# Patient Record
Sex: MALE | Race: WHITE | NOT HISPANIC OR LATINO | Employment: FULL TIME | URBAN - METROPOLITAN AREA
[De-identification: names, ages, dates, MRNs, and addresses within clinical notes are randomized per-mention and may not be internally consistent; named-entity substitution may affect disease eponyms.]

---

## 2020-10-16 ENCOUNTER — OFFICE VISIT (OUTPATIENT)
Dept: URGENT CARE | Facility: PHYSICIAN GROUP | Age: 55
End: 2020-10-16
Payer: COMMERCIAL

## 2020-10-16 VITALS
WEIGHT: 215 LBS | OXYGEN SATURATION: 98 % | BODY MASS INDEX: 31.84 KG/M2 | DIASTOLIC BLOOD PRESSURE: 88 MMHG | RESPIRATION RATE: 18 BRPM | TEMPERATURE: 97.1 F | SYSTOLIC BLOOD PRESSURE: 132 MMHG | HEART RATE: 75 BPM | HEIGHT: 69 IN

## 2020-10-16 DIAGNOSIS — K64.9 HEMORRHOIDS, UNSPECIFIED HEMORRHOID TYPE: ICD-10-CM

## 2020-10-16 DIAGNOSIS — J18.9 PNEUMONIA DUE TO INFECTIOUS ORGANISM, UNSPECIFIED LATERALITY, UNSPECIFIED PART OF LUNG: Primary | ICD-10-CM

## 2020-10-16 PROCEDURE — 99203 OFFICE O/P NEW LOW 30 MIN: CPT | Performed by: PHYSICIAN ASSISTANT

## 2020-10-16 RX ORDER — HYDROCORTISONE 25 MG/G
1 CREAM TOPICAL DAILY
Qty: 30 G | Refills: 0 | Status: SHIPPED | OUTPATIENT
Start: 2020-10-16 | End: 2020-10-23

## 2020-10-16 ASSESSMENT — ENCOUNTER SYMPTOMS
ABDOMINAL PAIN: 0
SPUTUM PRODUCTION: 0
ROS GI COMMENTS: HEMORRHOIDS
DIARRHEA: 0
SHORTNESS OF BREATH: 0
COUGH: 0
CONSTIPATION: 0
VOMITING: 0
SORE THROAT: 0
CHILLS: 0
FEVER: 0
NAUSEA: 0

## 2020-10-16 NOTE — PROGRESS NOTES
"Subjective:   Hayley Blas is a 54 y.o. male who presents for Pneumonia (requesting x-ray to make sure its gone, note to go back to work )        HPI     She presents to urgent care today for follow-up of pneumonia.  He was seen initially at [facility] ED 2 days ago for dizziness and chest pain.  He was evaluated and found to have pneumonia.  He was given a prescription for doxycycline twice daily for 10 days.  He has not started medication.  He denies cough, congestion, fever.  No nausea or vomiting.  No shortness of breath or chest pain.    The patient is requesting a repeat chest x-ray so he can be cleared for work.  He works as a  and visiting here from [place].    He is also requesting medication for hemorrhoids.  He has had hemorrhoids for the past few years.  He has been using over-the-counter Preparation H without relief.    Review of Systems   Constitutional: Negative for chills, fever and malaise/fatigue.   HENT: Negative for congestion and sore throat.    Respiratory: Negative for cough, sputum production and shortness of breath.         Pneumonia   Gastrointestinal: Negative for abdominal pain, constipation, diarrhea, nausea and vomiting.        Hemorrhoids   All other systems reviewed and are negative.      PMH:  has no past medical history on file.  MEDS:   Current Outpatient Medications:   •  hydrocortisone rectal (PERIANAL) 2.5% Cream, Insert 1 Application in rectum every day., Disp: 30 g, Rfl: 0  ALLERGIES: No Known Allergies  SURGHX: History reviewed. No pertinent surgical history.  SOCHX:  reports that he has never smoked. He has never used smokeless tobacco.  History reviewed. No pertinent family history.     Objective:   /88 (BP Location: Right arm, Patient Position: Sitting, BP Cuff Size: Large adult)   Pulse 75   Temp 36.2 °C (97.1 °F) (Temporal)   Resp 18   Ht 1.753 m (5' 9\")   Wt 97.5 kg (215 lb)   SpO2 98%   BMI 31.75 kg/m²     Physical Exam  Vitals signs " reviewed.   Constitutional:       General: He is not in acute distress.     Appearance: He is well-developed.   HENT:      Head: Normocephalic and atraumatic.      Right Ear: External ear normal.      Left Ear: External ear normal.      Nose: Nose normal.      Mouth/Throat:      Mouth: Mucous membranes are moist.      Pharynx: No oropharyngeal exudate or posterior oropharyngeal erythema.   Eyes:      Conjunctiva/sclera: Conjunctivae normal.      Pupils: Pupils are equal, round, and reactive to light.   Neck:      Musculoskeletal: Normal range of motion and neck supple.      Trachea: No tracheal deviation.   Cardiovascular:      Rate and Rhythm: Normal rate and regular rhythm.   Pulmonary:      Effort: Pulmonary effort is normal. No respiratory distress.      Breath sounds: Normal breath sounds. No stridor. No wheezing, rhonchi or rales.   Genitourinary:     Comments: Deferred per patient request  Skin:     General: Skin is warm and dry.      Capillary Refill: Capillary refill takes less than 2 seconds.   Neurological:      General: No focal deficit present.      Mental Status: He is alert and oriented to person, place, and time.   Psychiatric:         Mood and Affect: Mood normal.         Behavior: Behavior normal.           Assessment/Plan:     1. Pneumonia due to infectious organism, unspecified laterality, unspecified part of lung     2. Hemorrhoids, unspecified hemorrhoid type  hydrocortisone rectal (PERIANAL) 2.5% Cream     Supportive care reviewed.  I cannot clear him to return to work today but he will start doxycycline and follow-up with urgent care after completion of antibiotic.     Follow-up with primary care provider after returning home to [place].  If symptoms worsen or persist patient can return to clinic for reevaluation.  Red flags and strict emergency room precautions discussed. All side effects of medication discussed including allergic response, GI upset, tendon injury, etc. Patient confirmed  understanding of information.    Please note that this dictation was created using voice recognition software. I have made every reasonable attempt to correct obvious errors, but I expect that there are errors of grammar and possibly content that I did not discover before finalizing the note.

## 2020-10-23 ENCOUNTER — APPOINTMENT (OUTPATIENT)
Dept: RADIOLOGY | Facility: IMAGING CENTER | Age: 55
End: 2020-10-23
Attending: PHYSICIAN ASSISTANT
Payer: COMMERCIAL

## 2020-10-23 ENCOUNTER — OFFICE VISIT (OUTPATIENT)
Dept: URGENT CARE | Facility: PHYSICIAN GROUP | Age: 55
End: 2020-10-23
Payer: COMMERCIAL

## 2020-10-23 VITALS
WEIGHT: 215 LBS | BODY MASS INDEX: 31.84 KG/M2 | OXYGEN SATURATION: 99 % | RESPIRATION RATE: 16 BRPM | HEIGHT: 69 IN | TEMPERATURE: 97.6 F | SYSTOLIC BLOOD PRESSURE: 136 MMHG | DIASTOLIC BLOOD PRESSURE: 90 MMHG | HEART RATE: 83 BPM

## 2020-10-23 DIAGNOSIS — J18.9 PNEUMONIA DUE TO INFECTIOUS ORGANISM, UNSPECIFIED LATERALITY, UNSPECIFIED PART OF LUNG: ICD-10-CM

## 2020-10-23 PROCEDURE — 99213 OFFICE O/P EST LOW 20 MIN: CPT | Performed by: PHYSICIAN ASSISTANT

## 2020-10-23 PROCEDURE — 71046 X-RAY EXAM CHEST 2 VIEWS: CPT | Mod: TC | Performed by: PHYSICIAN ASSISTANT

## 2020-10-23 RX ORDER — DOXYCYCLINE HYCLATE 100 MG
100 TABLET ORAL 2 TIMES DAILY
COMMUNITY

## 2020-10-23 ASSESSMENT — ENCOUNTER SYMPTOMS
CHILLS: 0
COUGH: 0
FEVER: 0

## 2020-10-23 NOTE — LETTER
October 23, 2020         Patient: Hayley Blas   YOB: 1965   Date of Visit: 10/23/2020           To Whom it May Concern:    Hayley Blas was seen in my clinic on 10/23/2020. He can return to work once he gets a negative COVID test result. His lungs are clear and is showing no symptoms on exam today. Unfortunately his insurance can take up to 2 weeks to have completed at our clinic. Recommend following up for more rapid test at Pharmacy.   If you have any questions or concerns, please don't hesitate to call.        Sincerely,           Guy Pierre P.A.-C.  Electronically Signed

## 2020-10-23 NOTE — PROGRESS NOTES
"  Subjective:   Hayley Blas is a 54 y.o. male who presents today with   Chief Complaint   Patient presents with   • Pneumonia     needs clearance, feels so much better     Other  This is a new problem. The current episode started 1 to 4 weeks ago. The problem occurs constantly. The problem has been resolved. Pertinent negatives include no chills, congestion, coughing or fever. Nothing aggravates the symptoms. Treatments tried: antibiotics. The treatment provided significant relief.     Patient states he was seen at [facility] about 10 days ago in the ER and treated for pneumonia with doxycycline for 10 days.  He states he feels much better now and would like to be cleared for work.  He is unsure if he had a Covid test done.  PMH:  has no past medical history on file.  MEDS:   Current Outpatient Medications:   •  doxycycline (VIBRAMYCIN) 100 MG Tab, Take 100 mg by mouth 2 times a day., Disp: , Rfl:   ALLERGIES: No Known Allergies  SURGHX: No past surgical history on file.  SOCHX:  reports that he has never smoked. He has never used smokeless tobacco.  FH: Reviewed with patient, not pertinent to this visit.     Review of Systems   Constitutional: Negative for chills and fever.   HENT: Negative for congestion.    Respiratory: Negative for cough.    All other systems reviewed and are negative.       Objective:   /90 (BP Location: Right arm, Patient Position: Sitting, BP Cuff Size: Adult)   Pulse 83   Temp 36.4 °C (97.6 °F) (Temporal)   Resp 16   Ht 1.753 m (5' 9\")   Wt 97.5 kg (215 lb)   SpO2 99%   BMI 31.75 kg/m²   Physical Exam  Vitals signs and nursing note reviewed.   Constitutional:       General: He is not in acute distress.     Appearance: Normal appearance. He is well-developed and normal weight. He is not ill-appearing or toxic-appearing.   HENT:      Head: Normocephalic and atraumatic.      Right Ear: Hearing normal.      Left Ear: Hearing normal.      Nose: Nose normal. No congestion.      " Mouth/Throat:      Mouth: Mucous membranes are moist.      Pharynx: No oropharyngeal exudate or posterior oropharyngeal erythema.   Eyes:      Pupils: Pupils are equal, round, and reactive to light.   Cardiovascular:      Rate and Rhythm: Normal rate and regular rhythm.      Heart sounds: Normal heart sounds.   Pulmonary:      Effort: Pulmonary effort is normal.      Breath sounds: Normal breath sounds. No stridor. No wheezing, rhonchi or rales.   Musculoskeletal:      Comments: Normal movement in all 4 extremities   Skin:     General: Skin is warm and dry.   Neurological:      Mental Status: He is alert.      Coordination: Coordination normal.   Psychiatric:         Mood and Affect: Mood normal.       DX CHEST  FINDINGS:  LUNGS: The lungs are clear.     HEART and MEDIASTINUM: normal in size.     Pleura: There are no pleural effusion or pneumothoraces.     Osseous structures: No significant bony abnormality.        IMPRESSION:     Negative two views of the chest.  Assessment/Plan:   Assessment    1. Pneumonia due to infectious organism, unspecified laterality, unspecified part of lung  - DX-CHEST-2 VIEWS; Future    Other orders  - doxycycline (VIBRAMYCIN) 100 MG Tab; Take 100 mg by mouth 2 times a day.  Symptoms have all resolved. Given that patient is still in the 14-day window from symptom onset and unsure if it was Covid causing his symptoms although sounds likely I discussed with patient that I cannot completely clear him without having a negative test today.  And unfortunately given his insurance we have been expecting 2-week turnarounds on Covid test.  Encouraged him to go to [facility] for more prompt testing.  Patient agreeable with this plan now.    Please note that this dictation was created using voice recognition software. I have made every reasonable attempt to correct obvious errors, but I expect that there are errors of grammar and possibly content that I did not discover before finalizing the  note.    Guy Pierre PA-C